# Patient Record
Sex: FEMALE | Race: WHITE | NOT HISPANIC OR LATINO | Employment: UNEMPLOYED | URBAN - METROPOLITAN AREA
[De-identification: names, ages, dates, MRNs, and addresses within clinical notes are randomized per-mention and may not be internally consistent; named-entity substitution may affect disease eponyms.]

---

## 2017-06-30 PROBLEM — R13.10 DYSPHAGIA: Status: ACTIVE | Noted: 2017-06-30

## 2017-08-25 PROBLEM — R07.82 INTERCOSTAL PAIN: Status: ACTIVE | Noted: 2017-08-25

## 2017-08-25 PROBLEM — R07.9 CHEST PAIN: Status: ACTIVE | Noted: 2017-08-25

## 2017-08-25 PROBLEM — I10 ESSENTIAL HYPERTENSION: Status: ACTIVE | Noted: 2017-08-25

## 2017-08-26 PROBLEM — R94.39 POSITIVE CARDIAC STRESS TEST: Status: ACTIVE | Noted: 2017-08-26

## 2017-08-28 PROBLEM — R07.82 INTERCOSTAL PAIN: Status: RESOLVED | Noted: 2017-08-25 | Resolved: 2017-08-28

## 2017-08-28 PROBLEM — I25.10 CORONARY ARTERY DISEASE INVOLVING NATIVE CORONARY ARTERY OF NATIVE HEART: Status: ACTIVE | Noted: 2017-08-28

## 2017-09-07 PROBLEM — R45.89 ANXIETY ABOUT HEALTH: Status: ACTIVE | Noted: 2017-09-07

## 2017-09-07 PROBLEM — R51.9 EPISODIC HEADACHE: Status: ACTIVE | Noted: 2017-09-07

## 2017-09-07 PROBLEM — J45.20 MILD INTERMITTENT ASTHMA: Status: ACTIVE | Noted: 2017-09-07

## 2018-05-03 PROBLEM — I25.10 CORONARY ARTERY DISEASE INVOLVING NATIVE HEART: Status: ACTIVE | Noted: 2018-05-03

## 2018-08-10 PROBLEM — Z83.719 FAMILY HISTORY OF COLONIC POLYPS: Status: ACTIVE | Noted: 2018-08-10

## 2020-07-22 ENCOUNTER — CLINICAL SUPPORT (OUTPATIENT)
Dept: REHABILITATION | Facility: HOSPITAL | Age: 65
End: 2020-07-22
Attending: NURSE PRACTITIONER
Payer: MEDICAID

## 2020-07-22 DIAGNOSIS — M25.512 CHRONIC PAIN OF BOTH SHOULDERS: Primary | ICD-10-CM

## 2020-07-22 DIAGNOSIS — M25.612 DECREASED RANGE OF MOTION OF LEFT SHOULDER: ICD-10-CM

## 2020-07-22 DIAGNOSIS — M25.611 DECREASED RANGE OF MOTION OF RIGHT SHOULDER: ICD-10-CM

## 2020-07-22 DIAGNOSIS — M25.511 CHRONIC PAIN OF BOTH SHOULDERS: Primary | ICD-10-CM

## 2020-07-22 DIAGNOSIS — G89.29 CHRONIC PAIN OF BOTH SHOULDERS: Primary | ICD-10-CM

## 2020-07-22 DIAGNOSIS — M25.519 PAIN IN JOINT, SHOULDER REGION: ICD-10-CM

## 2020-07-22 DIAGNOSIS — M62.81 MUSCLE WEAKNESS: ICD-10-CM

## 2020-07-22 DIAGNOSIS — M54.2 NECK PAIN: ICD-10-CM

## 2020-07-22 PROCEDURE — 97162 PT EVAL MOD COMPLEX 30 MIN: CPT | Mod: PN | Performed by: PHYSICAL THERAPIST

## 2020-07-22 PROCEDURE — 97110 THERAPEUTIC EXERCISES: CPT | Mod: PN | Performed by: PHYSICAL THERAPIST

## 2020-07-22 NOTE — PLAN OF CARE
[facility]  Physical Therapy Initial Evaluation    Name: Hannah Resendiz  Clinic Number: [id number]    Therapy Diagnosis:   Encounter Diagnoses   Name Primary?    Pain in joint, shoulder region     Chronic pain of both shoulders Yes    Neck pain     Decreased range of motion of left shoulder     Decreased range of motion of right shoulder     Muscle weakness      Physician: Cherelle Valentin A*    Physician Orders: PT Eval and Treat   Medical Diagnosis from Referral: M25.519 (ICD-10-CM) - Pain in joint, shoulder region  Evaluation Date: 7/22/2020  Authorization Period Expiration: 8-22-20  Plan of Care Expiration: 9-16-20  Visit # / Visits authorized: 1/ 1  MD Follow up appointment: none scheduled    Time In: 4:37  Time Out: 5:30  Total Billable Time: 53 minutes    Precautions: hard of hearing/R hand weakness    Subjective   Date of onset: end of May 2020  History of current condition - Augustin reports: she lifted a heavy object and strained her shoulders, neck and upper back     Medical History:   Past Medical History:   Diagnosis Date    Arthritis     Asthma     Coronary artery disease     Depression     Esophageal stricture     Gastric ulcer     Gastroparesis     Hay fever     [place] (hard of hearing)     Hyperlipidemia     Migraine     PONV (postoperative nausea and vomiting)     PTSD (post-traumatic stress disorder)     Tachycardia     TMJ (dislocation of temporomandibular joint)        Surgical History:   Hannah Resendiz  has a past surgical history that includes Tonsillectomy; Stapedectomy (Bilateral); Brachial plexus exploration; Esophagogastroduodenoscopy; Colonoscopy (N/A, 8/10/2018); Hysterectomy (1991); and Oophorectomy (2000).    Medications:   Hannah has a current medication list which includes the following prescription(s): atenolol-chlorthalidone, atenolol-chlorthalidone, atenolol-chlorthalidone, atenolol-chlorthalidone,  atenolol-chlorthalidone, atorvastatin, bupropion, butalbital-acetaminophen-caffeine -40 mg, clonazepam, clopidogrel, diphenhydramine, furosemide, hyoscyamine sulfate, ipratropium-albuterol, linaclotide, loratadine, methocarbamol, metoprolol tartrate, nitroglycerin, olmesartan, pantoprazole, paroxetine, and trazodone.    Allergies:   Review of patient's allergies indicates:   Allergen Reactions    Adhesive     Aspirin Other (See Comments)     Stomach pain    Morpholine analogues Itching    Nsaids (non-steroidal anti-inflammatory drug) Other (See Comments)     Stomach pain    Penicillins Swelling and Other (See Comments)     Mouth sores    Sulfa (sulfonamide antibiotics) Swelling and Other (See Comments)     Mouth sores        Imaging, x:rays: pt was told she has degenerative arthritis in neck and shoulder and curved from muscle spasms    Prior Therapy: had surgery on forearm and had PT for that  Pt reports she had exploratory surgery due to compression injury when she 17 years old  Social History: lives with significant other  Occupation: retired she did insurance and [employer]  Prior Level of Function: pt drops things as a result of her previous surgery  Current Level of Function: reaching overhead     Pain:  Current 8/10, worst 9/10, best 8/10   Location: bilateral shoulders  Pt was R handed but after injury learned to use L hand   Description: Dull  Aggravating Factors: lying on shoulders or lifting  Easing Factors: nothing  Sleep is disturbed. Sleeping position: side sleeper    Pts goals: loosen my muscles so pain will go away    Objective     Postural examination in standing:  - forward head  - forward shoulder  - L shoulder high  - elevated scapula L with increased upper trap recruitment noted L   - normal thoracic kyphosis  - normal lumbar lordosis    Postural examination in sitting:   - forward head  - forward shoulder  - normal thoracic kyphosis  - normal lumbar lordosis      Functional  assessment: Pt is cautious and rigid in posture with all functional movements  - walking/gait:   - sit to stand:   - sit to supine:         - supine to sit:   - supine to prone: not tested     Cervical ROM: (measured in degrees sitting)  - flexion -  25                    - extension -  40                        - right side bending -  15        - left side bending -  15      - right rotation - 50  - left rotation - 35    Shoulder ROM: (measured in degrees standing)  Shoulder  RUE LUE   Active Flexion 100 100   Active Abduction 120 150     Scapular instability noted with elevation and abduction in standing with increased winging, lateral rotation and protraction of the L>R scapula.      Shoulder ROM: (measured in degrees supine)   Shoulder  RUE LUE   Flexion 115 130   Abduction 90 100   External Rotation neutral 75 75   External Rotation with abd  80 60   Internal Rotation 40 55     Muscle Strength  MMT R L   Elbow flexion 4/5 4/5   Elbow extension 4/5 4/5   Shoulder flexion 3/5 3/5   Shoulder abduction 3/5 3/5   Shoulder external rotation 3+/5 3/5   Shoulder internal rotation 4/5 4/5        Upper trap 5/5 5/5     Endurance is poor.    Palpation: pt did not c/o TTP during assessment    Joint mobility: difficulty getting pt to relax to fully assess mobility of shoulder and neck    Sensation: Impaired: along scar pt has an incision scar the length of her forearm         CMS Impairment/Limitation/Restriction for FOTO shoulder Survey    Therapist reviewed FOTO scores for Hannah Lynnblanc on 7/22/2020.   FOTO documents entered into [employer] - see Media section.    Limitation Score: 55%         TREATMENT   Treatment Time In: 5:10  Treatment Time Out: 5:30  Total Treatment time separate from Evaluation: 20 minutes    Augustin received therapeutic exercises to develop strength, endurance, ROM, flexibility, posture and core stabilization for 20 minutes including:  Pt c/o difficulty holding cane prolonged with R hand so kept  to 5 reps with cane ex  Flexion with cane x 5  ER with abd with cane x 5 each side    Scap squeeze x 10  Depressions x 10  Neck ROM all planes x 10 3 sec hold    Instructed pt to ice shoulders and neck at home as needed for soreness      Home Exercises and Patient Education Provided    Education provided:   - HEP  - proper posture    Written Home Exercises Provided: Yes   Exercises were reviewed and Augustin was able to demonstrate them prior to the end of the session.  Augustin demonstrated good  understanding of the education provided.     See EMR under Patient Instructions for exercises provided 7/22/2020.    Assessment   Hannah is a 64 y.o. female referred to outpatient Physical Therapy with a medical diagnosis of M25.519 (ICD-10-CM) - Pain in joint, shoulder region. Pt presents with pain in B shoulders and neck with loss of ROM and strength and poor posture with scapular dyskinesia L>R    Pt prognosis is Good.   Pt will benefit from skilled outpatient Physical Therapy to address the deficits stated above and in the chart below, provide pt/family education, and to maximize pt's level of independence.     Plan of care discussed with patient: Yes  Pt's spiritual, cultural and educational needs considered and patient is agreeable to the plan of care and goals as stated below:     Anticipated Barriers for therapy: has to rely on boyfriend for transportation    Medical Necessity is demonstrated by the following  History  Co-morbidities and personal factors that may impact the plan of care Co-morbidities:   history of previous R forearm surgery with resultant weakness R hand    Personal Factors:   no deficits     moderate   Examination  Body Structures and Functions, activity limitations and participation restrictions that may impact the plan of care Body Regions:   neck  upper extremities  trunk    Body Systems:    ROM  strength    Participation Restrictions:   ADL    Activity limitations:   Learning and applying  knowledge  no deficits    General Tasks and Commands  no deficits    Communication  no deficits    Mobility  lifting and carrying objects    Self care  Dressing, washing hair    Domestic Life  shopping  cooking  doing house work (cleaning house, washing dishes, laundry)  assisting others    Interactions/Relationships  no deficits    Life Areas  no deficits    Community and Social Life  no deficits         moderate   Clinical Presentation evolving clinical presentation with changing clinical characteristics moderate   Decision Making/ Complexity Score: moderate     GOALS:   Short Term Goals:  4 weeks    Increase range of motion 25%  Increase strength 1/2 muscle grade  Increase postural awareness to normal  Be able to perform HEP with minimal cueing required    Long Term Goals: 8 weeks  Increase range of motion to 75%to 100% of full  Increase strength 1 muscle grade  Improve scapular stabilization to normal  Restore normal sleep habits without disturbances due to pain as prior to injury  Restore ability to reach fully overhead without increased pain as prior to injury  Restore ability to lift and carry for ADL as prior to injury  Restore ability to reach in all planes without increased pain or difficulty as prior to injury  Restore ability to perform ADL's and household activities independently and without increased pain as prior to injury  Pt to be independent with HEP to improve ROM and independence with ADL's    Plan   Plan of care Certification: 7/22/2020 to 9-16-20.    Outpatient Physical Therapy 2 times weekly for 8 weeks to include the following interventions: Therapeutic exercises, manual therapy, neuromuscular re-education, therapeutic activities, modalities such as moist heat, ice, ultrasound and electrical stimulation and dry needling.    Avis Hunt, PT    I certify the need for these services furnished under this plan of treatment and while under my care.    ____________________________________  Physician/Referring Practitioner                                Date of Signature

## 2020-08-03 ENCOUNTER — TELEPHONE (OUTPATIENT)
Dept: REHABILITATION | Facility: HOSPITAL | Age: 65
End: 2020-08-03

## 2020-09-24 PROBLEM — R91.8 NONSPECIFIC ABNORMAL FINDING OF LUNG FIELD: Status: ACTIVE | Noted: 2020-09-24

## 2020-10-02 ENCOUNTER — LAB VISIT (OUTPATIENT)
Dept: LAB | Facility: HOSPITAL | Age: 65
End: 2020-10-02
Attending: INTERNAL MEDICINE
Payer: MEDICAID

## 2020-10-02 DIAGNOSIS — C80.1: ICD-10-CM

## 2020-10-02 DIAGNOSIS — C78.00 MALIGNANT NEOPLASM METASTATIC TO LUNG, UNSPECIFIED LATERALITY: ICD-10-CM

## 2020-10-02 DIAGNOSIS — C78.6 PERITONEAL METASTASES: ICD-10-CM

## 2020-10-02 DIAGNOSIS — C79.51 BONE METASTASES: ICD-10-CM

## 2020-10-02 PROCEDURE — 81400 MOPATH PROCEDURE LEVEL 1: CPT

## 2020-10-02 PROCEDURE — 36415 COLL VENOUS BLD VENIPUNCTURE: CPT | Mod: PN

## 2020-10-06 ENCOUNTER — LAB VISIT (OUTPATIENT)
Dept: LAB | Facility: HOSPITAL | Age: 65
End: 2020-10-06
Attending: PHYSICIAN ASSISTANT
Payer: MEDICAID

## 2020-10-06 DIAGNOSIS — C80.1: ICD-10-CM

## 2020-10-06 DIAGNOSIS — C78.00 MALIGNANT NEOPLASM METASTATIC TO LUNG, UNSPECIFIED LATERALITY: ICD-10-CM

## 2020-10-06 DIAGNOSIS — C79.51 BONE METASTASES: ICD-10-CM

## 2020-10-06 DIAGNOSIS — C78.6 PERITONEAL METASTASES: ICD-10-CM

## 2020-10-06 LAB
ALBUMIN SERPL BCP-MCNC: 3.9 G/DL (ref 3.5–5.2)
ALP SERPL-CCNC: 333 U/L (ref 38–145)
ALT SERPL W/O P-5'-P-CCNC: 19 U/L (ref 0–35)
ANION GAP SERPL CALC-SCNC: 7 MMOL/L (ref 8–16)
AST SERPL-CCNC: 25 U/L (ref 14–36)
BASOPHILS # BLD AUTO: 0.03 K/UL (ref 0–0.2)
BASOPHILS NFR BLD: 0.4 % (ref 0–1.9)
BILIRUB SERPL-MCNC: 0.6 MG/DL (ref 0.2–1.3)
BUN SERPL-MCNC: 9 MG/DL (ref 7–18)
CALCIUM SERPL-MCNC: 9.2 MG/DL (ref 8.4–10.2)
CEA SERPL-MCNC: 296 NG/ML (ref 0–5)
CHLORIDE SERPL-SCNC: 99 MMOL/L (ref 95–110)
CO2 SERPL-SCNC: 25 MMOL/L (ref 22–31)
CREAT SERPL-MCNC: 0.54 MG/DL (ref 0.5–1.4)
DIFFERENTIAL METHOD: ABNORMAL
EOSINOPHIL # BLD AUTO: 0.1 K/UL (ref 0–0.5)
EOSINOPHIL NFR BLD: 1.6 % (ref 0–8)
ERYTHROCYTE [DISTWIDTH] IN BLOOD BY AUTOMATED COUNT: 12.9 % (ref 11.5–14.5)
EST. GFR  (AFRICAN AMERICAN): >60 ML/MIN/1.73 M^2
EST. GFR  (NON AFRICAN AMERICAN): >60 ML/MIN/1.73 M^2
GLUCOSE SERPL-MCNC: 110 MG/DL (ref 70–110)
HCT VFR BLD AUTO: 33.7 % (ref 37–48.5)
HGB BLD-MCNC: 11.2 G/DL (ref 12–16)
IMM GRANULOCYTES # BLD AUTO: 0.04 K/UL (ref 0–0.04)
IMM GRANULOCYTES NFR BLD AUTO: 0.6 % (ref 0–0.5)
LYMPHOCYTES # BLD AUTO: 1.3 K/UL (ref 1–4.8)
LYMPHOCYTES NFR BLD: 19.6 % (ref 18–48)
MAGNESIUM SERPL-MCNC: 1.7 MG/DL (ref 1.6–2.6)
MCH RBC QN AUTO: 30.4 PG (ref 27–31)
MCHC RBC AUTO-ENTMCNC: 33.2 G/DL (ref 32–36)
MCV RBC AUTO: 91 FL (ref 82–98)
MONOCYTES # BLD AUTO: 0.6 K/UL (ref 0.3–1)
MONOCYTES NFR BLD: 8.5 % (ref 4–15)
NEUTROPHILS # BLD AUTO: 4.6 K/UL (ref 1.8–7.7)
NEUTROPHILS NFR BLD: 69.3 % (ref 38–73)
NRBC BLD-RTO: 0 /100 WBC
PLATELET # BLD AUTO: 222 K/UL (ref 150–350)
PMV BLD AUTO: 8.5 FL (ref 9.2–12.9)
POTASSIUM SERPL-SCNC: 3.8 MMOL/L (ref 3.5–5.1)
PROT SERPL-MCNC: 6.6 G/DL (ref 6–8.4)
RBC # BLD AUTO: 3.69 M/UL (ref 4–5.4)
SODIUM SERPL-SCNC: 131 MMOL/L (ref 136–145)
WBC # BLD AUTO: 6.69 K/UL (ref 3.9–12.7)

## 2020-10-06 PROCEDURE — 80053 COMPREHEN METABOLIC PANEL: CPT

## 2020-10-06 PROCEDURE — 83735 ASSAY OF MAGNESIUM: CPT

## 2020-10-06 PROCEDURE — 82378 CARCINOEMBRYONIC ANTIGEN: CPT | Mod: PN

## 2020-10-06 PROCEDURE — 85025 COMPLETE CBC W/AUTO DIFF WBC: CPT

## 2020-10-06 PROCEDURE — 85025 COMPLETE CBC W/AUTO DIFF WBC: CPT | Mod: PN

## 2020-10-06 PROCEDURE — 86301 IMMUNOASSAY TUMOR CA 19-9: CPT

## 2020-10-06 PROCEDURE — 83735 ASSAY OF MAGNESIUM: CPT | Mod: PN

## 2020-10-06 PROCEDURE — 36415 COLL VENOUS BLD VENIPUNCTURE: CPT | Mod: PN

## 2020-10-06 PROCEDURE — 82378 CARCINOEMBRYONIC ANTIGEN: CPT

## 2020-10-06 PROCEDURE — 80053 COMPREHEN METABOLIC PANEL: CPT | Mod: PN

## 2020-10-07 LAB — CANCER AG19-9 SERPL-ACNC: ABNORMAL U/ML (ref 2–40)

## 2020-10-08 ENCOUNTER — DOCUMENTATION ONLY (OUTPATIENT)
Dept: INFUSION THERAPY | Facility: HOSPITAL | Age: 65
End: 2020-10-08

## 2020-10-08 NOTE — PROGRESS NOTES
Spoke with Lakeisha, pt tx to be delayed until 10/26 per radiation due to MD wants pt to be finished with radiation prior to starting chemo.

## 2020-10-09 PROBLEM — C18.9 COLON CARCINOMA: Status: ACTIVE | Noted: 2020-10-09

## 2020-10-09 LAB
ANNOTATION COMMENT IMP: NORMAL
DPYD DISCLAIMER: NORMAL
DPYD INTERPRETATION: NORMAL
DPYD METHOD: NORMAL
DPYD RESULT DETAILS: NORMAL
DYPD TOXICITY RISK: NORMAL
REVIEWED BY: NORMAL

## 2020-10-12 ENCOUNTER — LAB VISIT (OUTPATIENT)
Dept: LAB | Facility: HOSPITAL | Age: 65
End: 2020-10-12
Attending: INTERNAL MEDICINE
Payer: MEDICAID

## 2020-10-12 ENCOUNTER — DOCUMENTATION ONLY (OUTPATIENT)
Dept: PHARMACY | Facility: AMBULARY SURGERY CENTER | Age: 65
End: 2020-10-12

## 2020-10-12 DIAGNOSIS — C78.6 PERITONEAL METASTASES: ICD-10-CM

## 2020-10-12 DIAGNOSIS — C78.00 MALIGNANT NEOPLASM METASTATIC TO LUNG, UNSPECIFIED LATERALITY: ICD-10-CM

## 2020-10-12 DIAGNOSIS — C79.51 BONE METASTASES: ICD-10-CM

## 2020-10-12 DIAGNOSIS — C80.1: ICD-10-CM

## 2020-10-12 LAB
ALBUMIN SERPL BCP-MCNC: 3.4 G/DL (ref 3.5–5.2)
ALP SERPL-CCNC: 334 U/L (ref 38–145)
ALT SERPL W/O P-5'-P-CCNC: 16 U/L (ref 0–35)
ANION GAP SERPL CALC-SCNC: 6 MMOL/L (ref 8–16)
AST SERPL-CCNC: 24 U/L (ref 14–36)
BASOPHILS # BLD AUTO: 0.02 K/UL (ref 0–0.2)
BASOPHILS NFR BLD: 0.4 % (ref 0–1.9)
BILIRUB SERPL-MCNC: 0.7 MG/DL (ref 0.2–1.3)
BUN SERPL-MCNC: 7 MG/DL (ref 7–18)
CALCIUM SERPL-MCNC: 8.8 MG/DL (ref 8.4–10.2)
CANCER AG19-9 SERPL-ACNC: ABNORMAL U/ML (ref 2–40)
CEA SERPL-MCNC: 250 NG/ML (ref 0–5)
CHLORIDE SERPL-SCNC: 100 MMOL/L (ref 95–110)
CO2 SERPL-SCNC: 28 MMOL/L (ref 22–31)
CREAT SERPL-MCNC: 0.51 MG/DL (ref 0.5–1.4)
DIFFERENTIAL METHOD: ABNORMAL
EOSINOPHIL # BLD AUTO: 0.1 K/UL (ref 0–0.5)
EOSINOPHIL NFR BLD: 1.5 % (ref 0–8)
ERYTHROCYTE [DISTWIDTH] IN BLOOD BY AUTOMATED COUNT: 13.7 % (ref 11.5–14.5)
EST. GFR  (AFRICAN AMERICAN): >60 ML/MIN/1.73 M^2
EST. GFR  (NON AFRICAN AMERICAN): >60 ML/MIN/1.73 M^2
GLUCOSE SERPL-MCNC: 107 MG/DL (ref 70–110)
HCT VFR BLD AUTO: 31.3 % (ref 37–48.5)
HGB BLD-MCNC: 10.3 G/DL (ref 12–16)
IMM GRANULOCYTES # BLD AUTO: 0.02 K/UL (ref 0–0.04)
IMM GRANULOCYTES NFR BLD AUTO: 0.4 % (ref 0–0.5)
LYMPHOCYTES # BLD AUTO: 1 K/UL (ref 1–4.8)
LYMPHOCYTES NFR BLD: 22.3 % (ref 18–48)
MAGNESIUM SERPL-MCNC: 2.1 MG/DL (ref 1.6–2.6)
MCH RBC QN AUTO: 30.9 PG (ref 27–31)
MCHC RBC AUTO-ENTMCNC: 32.9 G/DL (ref 32–36)
MCV RBC AUTO: 94 FL (ref 82–98)
MONOCYTES # BLD AUTO: 0.5 K/UL (ref 0.3–1)
MONOCYTES NFR BLD: 9.7 % (ref 4–15)
NEUTROPHILS # BLD AUTO: 3.1 K/UL (ref 1.8–7.7)
NEUTROPHILS NFR BLD: 65.7 % (ref 38–73)
NRBC BLD-RTO: 0 /100 WBC
PLATELET # BLD AUTO: 269 K/UL (ref 150–350)
PMV BLD AUTO: 8.4 FL (ref 9.2–12.9)
POTASSIUM SERPL-SCNC: 4 MMOL/L (ref 3.5–5.1)
PROT SERPL-MCNC: 5.9 G/DL (ref 6–8.4)
RBC # BLD AUTO: 3.33 M/UL (ref 4–5.4)
SODIUM SERPL-SCNC: 134 MMOL/L (ref 136–145)
WBC # BLD AUTO: 4.66 K/UL (ref 3.9–12.7)

## 2020-10-12 PROCEDURE — 85025 COMPLETE CBC W/AUTO DIFF WBC: CPT

## 2020-10-12 PROCEDURE — 83735 ASSAY OF MAGNESIUM: CPT | Mod: PN

## 2020-10-12 PROCEDURE — 83735 ASSAY OF MAGNESIUM: CPT

## 2020-10-12 PROCEDURE — 80053 COMPREHEN METABOLIC PANEL: CPT | Mod: PN

## 2020-10-12 PROCEDURE — 82378 CARCINOEMBRYONIC ANTIGEN: CPT

## 2020-10-12 PROCEDURE — 85025 COMPLETE CBC W/AUTO DIFF WBC: CPT | Mod: PN

## 2020-10-12 PROCEDURE — 36415 COLL VENOUS BLD VENIPUNCTURE: CPT | Mod: PN

## 2020-10-12 PROCEDURE — 82378 CARCINOEMBRYONIC ANTIGEN: CPT | Mod: PN

## 2020-10-12 PROCEDURE — 80053 COMPREHEN METABOLIC PANEL: CPT

## 2020-10-12 PROCEDURE — 86301 IMMUNOASSAY TUMOR CA 19-9: CPT

## 2020-10-12 NOTE — PROGRESS NOTES
Pharmacy Treatment Plan Review    Patient  Hannah Resendiz, 65 y.o.  1955    Indication: Pancreatic Cancer     Labs:  CBC  Lab Results   Component Value Date    WBC 6.33 10/09/2020    HGB 11.9 (L) 10/09/2020    HCT 34.6 (L) 10/09/2020    MCV 90 10/09/2020     10/09/2020       BMP  Lab Results   Component Value Date     (L) 10/09/2020    K 3.6 10/09/2020    CL 99 10/09/2020    CO2 23 10/09/2020    BUN 8 10/09/2020    CREATININE 0.51 10/09/2020    CALCIUM 9.6 10/09/2020    ANIONGAP 8 10/09/2020    ESTGFRAFRICA >60 10/09/2020    EGFRNONAA >60 10/09/2020       LFTs  Lab Results   Component Value Date    ALT 19 10/06/2020    AST 25 10/06/2020    ALKPHOS 333 (H) 10/06/2020    BILITOT 0.6 10/06/2020       The patient is scheduled to start the following infusion:   OP PANC mFOLFIRINOX Q2W No 5FU push    14-day cycle for 12 cycles of:    Chemotherapy:     irinotecan (CAMPTOSAR) 150 mg/m2  in sodium chloride 0.9% 500 mL chemo infusion, Intravenous, on day 1    leucovorin calcium 400 mg/m2 in dextrose 5 % 250 mL infusion, Intravenous, on day 1    oxaliplatin (ELOXATIN) 85 mg/m2  in dextrose 5 % 500 mL chemo infusion, Intravenous, on day 1    fluorouracil (ADRUCIL) 2,400 mg/m2 in sodium chloride 0.9% 190.7 mL chemo infusion, Intravenous, Over 46 hours, starting day 1    Premeds  -Palonosetron 0.25 mg/Dexamethasone 12 mg     Supportive meds  -Atropine 0.4 mg IV with irinotecan  -Neulasta OBI per Dr. Shields's note on 10/2  -Xgeva q28 days    The treatment plan per [facility] template     Baldemar WalkerD

## 2021-04-29 ENCOUNTER — PATIENT MESSAGE (OUTPATIENT)
Dept: RESEARCH | Facility: HOSPITAL | Age: 66
End: 2021-04-29